# Patient Record
Sex: FEMALE | Race: BLACK OR AFRICAN AMERICAN | URBAN - METROPOLITAN AREA
[De-identification: names, ages, dates, MRNs, and addresses within clinical notes are randomized per-mention and may not be internally consistent; named-entity substitution may affect disease eponyms.]

---

## 2019-10-06 ENCOUNTER — EMERGENCY (EMERGENCY)
Facility: HOSPITAL | Age: 69
LOS: 0 days | Discharge: HOME | End: 2019-10-06
Attending: EMERGENCY MEDICINE | Admitting: EMERGENCY MEDICINE
Payer: MEDICARE

## 2019-10-06 VITALS
HEART RATE: 77 BPM | TEMPERATURE: 98 F | DIASTOLIC BLOOD PRESSURE: 70 MMHG | HEIGHT: 62 IN | SYSTOLIC BLOOD PRESSURE: 140 MMHG | WEIGHT: 151.9 LBS | RESPIRATION RATE: 16 BRPM | OXYGEN SATURATION: 97 %

## 2019-10-06 DIAGNOSIS — S00.211A ABRASION OF RIGHT EYELID AND PERIOCULAR AREA, INITIAL ENCOUNTER: ICD-10-CM

## 2019-10-06 DIAGNOSIS — S82.831A OTHER FRACTURE OF UPPER AND LOWER END OF RIGHT FIBULA, INITIAL ENCOUNTER FOR CLOSED FRACTURE: ICD-10-CM
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
## 2019-10-06 DIAGNOSIS — M20.011 MALLET FINGER OF RIGHT FINGER(S): ICD-10-CM
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
## 2019-10-06 PROCEDURE — 99284 EMERGENCY DEPT VISIT MOD MDM: CPT

## 2019-10-06 PROCEDURE — 73030 X-RAY EXAM OF SHOULDER: CPT | Mod: 26,RT

## 2019-10-06 PROCEDURE — 73120 X-RAY EXAM OF HAND: CPT | Mod: 26,RT

## 2019-10-06 PROCEDURE — 73562 X-RAY EXAM OF KNEE 3: CPT | Mod: 26,RT

## 2019-10-06 RX ORDER — ACETAMINOPHEN 500 MG
650 TABLET ORAL ONCE
Refills: 0 | Status: COMPLETED | OUTPATIENT
Start: 2019-10-06 | End: 2019-10-06

## 2019-10-06 RX ADMIN — Medication 650 MILLIGRAM(S): at 12:30

## 2019-10-06 NOTE — ED ADULT TRIAGE NOTE - NS ED NURSE DIRECT TO ROOM YN
Mr Kennedy presented with SOB, GALAVIZ, and fatigue after having been off of lasix for 10 weeks. Initial workup was significant for CXR with cardiomegaly, congestion and edema and a BNP of 1220. He was started on lasix 40 TID with good diuretic response. CPAP ordered qhs. ECHO showed EF 35, diastolic dysfunction, elevated PA pressures and moderate TR. Breathing improved throughout admission and oxygen requirements decreased. Lasix IV switched to lasix 40 mg PO daily prior to discharge.    Yes

## 2019-10-06 NOTE — ED PROVIDER NOTE - CLINICAL SUMMARY MEDICAL DECISION MAKING FREE TEXT BOX
patient sustained fall from standing, no loc and no vomiting. she has pain and swelling to the right hand. with superficial laceration noted.  she has no focal deficits at this time we obtained xrays of her wrist and leg found to have avusion fracture of the 3rd right finger as well as avulsion fracture of the fibula we patient the patient is splint and discharge at this time

## 2019-10-06 NOTE — ED PROCEDURE NOTE - NS ED PERI NEURO NEG
Pre-application: Motor, sensory, and vascular responses intact in the injured extremity./The patient/caregiver verbalized understanding of how to care for the injured extremity with splint/Post-application: Motor, sensory, and vascular responses intact in the injured extremity.
Pre-application: Motor, sensory, and vascular responses intact in the injured extremity./Post-application: Motor, sensory, and vascular responses intact in the injured extremity./The patient/caregiver verbalized understanding of how to care for the injured extremity with splint

## 2019-10-06 NOTE — ED PROVIDER NOTE - CARE PROVIDER_API CALL
Haja Maharaj)  Orthopaedic Surgery  [address] [place] [id number]  Phone: [phone]  Fax: [phone]  Follow Up Time: 1-3 Days    Joey Douglas)  Orthopaedic Surgery  [address] [place], [place] [id number]  Phone: [phone]  Fax: [phone]  Follow Up Time: 1-3 Days

## 2019-10-06 NOTE — ED ADULT TRIAGE NOTE - CHIEF COMPLAINT QUOTE
pt. stumble and fell on her face (right).Denies LOC. Injury on the right hand and right face. Last Tetanus shot was last year

## 2019-10-06 NOTE — ED PROCEDURE NOTE - NS ED PERI VASCULAR NEG
fingers/toes warm to touch/no paresthesia/no cyanosis of extremity/no swelling/capillary refill time < 2 seconds
no swelling/no paresthesia/no cyanosis of extremity/fingers/toes warm to touch/capillary refill time < 2 seconds

## 2019-10-06 NOTE — ED PROVIDER NOTE - PATIENT PORTAL LINK FT
You can access the FollowMyHealth Patient Portal offered by [facility] by registering at the following website: [email or web address]. By joining [employer]’s FollowMyHealth portal, you will also be able to view your health information using other applications (apps) compatible with our system.

## 2019-10-06 NOTE — ED PROCEDURE NOTE - CPROC ED POST PROC CARE GUIDE1
Keep the cast/splint/dressing clean and dry./Instructed patient/caregiver regarding signs and symptoms of infection./Elevate the injured extremity as instructed./Instructed patient/caregiver to follow-up with primary care physician./Verbal/written post procedure instructions were given to patient/caregiver.
Instructed patient/caregiver regarding signs and symptoms of infection./Elevate the injured extremity as instructed./Instructed patient/caregiver to follow-up with primary care physician./Verbal/written post procedure instructions were given to patient/caregiver./Keep the cast/splint/dressing clean and dry.

## 2019-10-06 NOTE — ED PROVIDER NOTE - OBJECTIVE STATEMENT
69 year old female with no pmhx, NOT on anticoagulation/antiplatelets, presents to the ED for evaluation of constant, mild, non-radiating right face, right shoulder, right hand, and right knee pain s/p mechanical trip and fall 1 hour prior to arrival. Patient was at [employer] when she tripped and fell onto her right side landing on sidewalk. +right facial trauma, No LOC. Remembers everything before/during/after fall. Has been ambulatory since the fall. Denies headaches, vision changes, neck pain/stiffness, chest pain, shortness of breath, back pain, abd pain, n/v, dizziness, lightheadedness, behavioral/mental status change, paresthesias, numbness, weakness. Tetanus UTD.

## 2019-10-06 NOTE — ED PROVIDER NOTE - CARE PLAN
Principal Discharge DX:	Mallet finger of right hand  Secondary Diagnosis:	Fall  Secondary Diagnosis:	Other closed fracture of proximal end of right fibula, initial encounter

## 2019-10-06 NOTE — ED PROVIDER NOTE - CARE PROVIDERS DIRECT ADDRESSES
[email or web address]

## 2019-10-06 NOTE — ED PROVIDER NOTE - ATTENDING CONTRIBUTION TO CARE
presents s/p fall 69 year old female with no pmhx, NOT on anticoagulation/antiplatelets, presents to the ED for evaluation of constant, mild, non-radiating right face, right shoulder, right hand, and right knee pain s/p mechanical trip and fall 1 hour prior to arrival. Patient was at [employer] when she tripped and fell onto her right side landing on sidewalk. +right facial trauma, No LOC. Remembers everything before/during/after fall. Has been ambulatory since the fall. Denies headaches, vision changes, neck pain/stiffness, chest pain, shortness of breath, back pain, abd pain, n/v, dizziness, lightheadedness, behavioral/mental status change, paresthesias, numbness, weakness. Tetanus UTD.  On physical exam the patient sustained no loc and no vomiting. she has pain and swelling to the right 3rd finger as well as the right leg we obtained xrays and placed patient in splint with follow up to ortho

## 2019-10-06 NOTE — ED PROVIDER NOTE - PHYSICAL EXAMINATION
VITALS:  I have reviewed the initial vital signs.  GENERAL: Well-developed, well-nourished, in no acute distress.  HEENT: 3 superficial abrasions to right lateral orbit. no bony ttp or step off. No austin sign, raccoon eyes, or hemotympanum. Sclera clear. No conjunctival injection. EOMI, PERRLA. No hyphema or EOM entrapment. Mucous membranes moist. Dentition stable. No Leforte fx.   NECK: supple w FROM. No paraspinal muscle ttp. No midline cervical spinous tenderness, step offs, or deformity.  CARDIO: RRR, nl S1 and S2. No murmurs, rubs, or gallops. No peripheral edema. 2+ radial and pedal pulses bilaterally. No chest wall tenderness.  PULM: Normal effort. No tachypnea or retractions. CTA b/l without wheezes, rales, or rhonchi. Good air entry.  MSK: +ttp and swelling to right 3rd disal phalanx and right 2nd phalanx. Decreased ROM 2/2 pain. Otherwise FROM to extremities x4 w/o swelling/erythema/ecchymosis/deformity/ttp. No midline thoracic or lumbar spinous tenderness, step offs, or deformity. Pelvis stable.  GI: Abdomen soft and non-distended. Nontender throughout.  : No CVA tenderness b/l.  SKIN: Warm, dry. quarter size superficial abrasion to anterior right knee. Capillary refill <2 seconds.  NEURO: A&Ox3. Speech clear. CN II-XII intact. 5/5 strength to upper and lower extremities b/l. Sensation intact and equal throughout. Finger to nose intact. Negative Romberg.

## 2019-10-06 NOTE — ED ADULT NURSE NOTE - NSIMPLEMENTINTERV_GEN_ALL_ED
Implemented All Fall Risk Interventions:  [place] to call system. Call bell, personal items and telephone within reach. Instruct patient to call for assistance. Room bathroom lighting operational. Non-slip footwear when patient is off stretcher. Physically safe environment: no spills, clutter or unnecessary equipment. Stretcher in lowest position, wheels locked, appropriate side rails in place. Provide visual cue, wrist band, yellow gown, etc. Monitor gait and stability. Monitor for mental status changes and reorient to person, place, and time. Review medications for side effects contributing to fall risk. Reinforce activity limits and safety measures with patient and family.

## 2019-10-06 NOTE — ED PROVIDER NOTE - PROVIDER TOKENS
PROVIDER:[TOKEN:[[id number]],FOLLOWUP:[1-3 Days]],PROVIDER:[TOKEN:[[id number]],FOLLOWUP:[1-3 Days]]

## 2023-05-01 NOTE — ED PROVIDER NOTE - PRINCIPAL DIAGNOSIS
Mallet finger of right hand Rhomboid Transposition Flap Text: The defect edges were debeveled with a #15 scalpel blade.  Given the location of the defect and the proximity to free margins a rhomboid transposition flap was deemed most appropriate.  Using a sterile surgical marker, an appropriate rhomboid flap was drawn incorporating the defect.    The area thus outlined was incised deep to adipose tissue with a #15 scalpel blade.  The skin margins were undermined to an appropriate distance in all directions utilizing iris scissors. Adjacent tissue was incised and carried over to close the defect.
